# Patient Record
Sex: MALE | Race: WHITE | NOT HISPANIC OR LATINO | URBAN - METROPOLITAN AREA
[De-identification: names, ages, dates, MRNs, and addresses within clinical notes are randomized per-mention and may not be internally consistent; named-entity substitution may affect disease eponyms.]

---

## 2022-07-22 ENCOUNTER — INPATIENT (INPATIENT)
Facility: HOSPITAL | Age: 22
LOS: 0 days | Discharge: HOME | DRG: 379 | End: 2022-07-22
Attending: SPECIALIST | Admitting: SPECIALIST
Payer: COMMERCIAL

## 2022-07-22 VITALS
HEART RATE: 85 BPM | WEIGHT: 205.03 LBS | OXYGEN SATURATION: 98 % | DIASTOLIC BLOOD PRESSURE: 74 MMHG | RESPIRATION RATE: 16 BRPM | TEMPERATURE: 98 F | HEIGHT: 68 IN | SYSTOLIC BLOOD PRESSURE: 114 MMHG

## 2022-07-22 VITALS
OXYGEN SATURATION: 99 % | HEART RATE: 66 BPM | SYSTOLIC BLOOD PRESSURE: 124 MMHG | RESPIRATION RATE: 18 BRPM | DIASTOLIC BLOOD PRESSURE: 76 MMHG | TEMPERATURE: 98 F

## 2022-07-22 DIAGNOSIS — K92.1 MELENA: ICD-10-CM

## 2022-07-22 DIAGNOSIS — Z29.9 ENCOUNTER FOR PROPHYLACTIC MEASURES, UNSPECIFIED: ICD-10-CM

## 2022-07-22 LAB
ANION GAP SERPL CALC-SCNC: 8 MMOL/L — SIGNIFICANT CHANGE UP (ref 5–17)
APTT BLD: 29.2 SEC — SIGNIFICANT CHANGE UP (ref 27.5–35.5)
BLD GP AB SCN SERPL QL: NEGATIVE — SIGNIFICANT CHANGE UP
BLD GP AB SCN SERPL QL: NEGATIVE — SIGNIFICANT CHANGE UP
BUN SERPL-MCNC: 16 MG/DL — SIGNIFICANT CHANGE UP (ref 7–23)
CALCIUM SERPL-MCNC: 8.7 MG/DL — SIGNIFICANT CHANGE UP (ref 8.4–10.5)
CHLORIDE SERPL-SCNC: 106 MMOL/L — SIGNIFICANT CHANGE UP (ref 96–108)
CO2 SERPL-SCNC: 27 MMOL/L — SIGNIFICANT CHANGE UP (ref 22–31)
CREAT SERPL-MCNC: 0.85 MG/DL — SIGNIFICANT CHANGE UP (ref 0.5–1.3)
EGFR: 125 ML/MIN/1.73M2 — SIGNIFICANT CHANGE UP
GLUCOSE SERPL-MCNC: 96 MG/DL — SIGNIFICANT CHANGE UP (ref 70–99)
HCT VFR BLD CALC: 37.9 % — LOW (ref 39–50)
HGB BLD-MCNC: 12.8 G/DL — LOW (ref 13–17)
INR BLD: 1.08 — SIGNIFICANT CHANGE UP (ref 0.88–1.16)
MCHC RBC-ENTMCNC: 29.8 PG — SIGNIFICANT CHANGE UP (ref 27–34)
MCHC RBC-ENTMCNC: 33.8 GM/DL — SIGNIFICANT CHANGE UP (ref 32–36)
MCV RBC AUTO: 88.1 FL — SIGNIFICANT CHANGE UP (ref 80–100)
NRBC # BLD: 0 /100 WBCS — SIGNIFICANT CHANGE UP (ref 0–0)
PLATELET # BLD AUTO: 174 K/UL — SIGNIFICANT CHANGE UP (ref 150–400)
POTASSIUM SERPL-MCNC: 4.4 MMOL/L — SIGNIFICANT CHANGE UP (ref 3.5–5.3)
POTASSIUM SERPL-SCNC: 4.4 MMOL/L — SIGNIFICANT CHANGE UP (ref 3.5–5.3)
PROTHROM AB SERPL-ACNC: 12.9 SEC — SIGNIFICANT CHANGE UP (ref 10.5–13.4)
RBC # BLD: 4.3 M/UL — SIGNIFICANT CHANGE UP (ref 4.2–5.8)
RBC # FLD: 12.8 % — SIGNIFICANT CHANGE UP (ref 10.3–14.5)
RH IG SCN BLD-IMP: POSITIVE — SIGNIFICANT CHANGE UP
RH IG SCN BLD-IMP: POSITIVE — SIGNIFICANT CHANGE UP
SARS-COV-2 RNA SPEC QL NAA+PROBE: NEGATIVE — SIGNIFICANT CHANGE UP
SODIUM SERPL-SCNC: 141 MMOL/L — SIGNIFICANT CHANGE UP (ref 135–145)
WBC # BLD: 8.47 K/UL — SIGNIFICANT CHANGE UP (ref 3.8–10.5)
WBC # FLD AUTO: 8.47 K/UL — SIGNIFICANT CHANGE UP (ref 3.8–10.5)

## 2022-07-22 PROCEDURE — 85027 COMPLETE CBC AUTOMATED: CPT

## 2022-07-22 PROCEDURE — 80048 BASIC METABOLIC PNL TOTAL CA: CPT

## 2022-07-22 PROCEDURE — 86900 BLOOD TYPING SEROLOGIC ABO: CPT

## 2022-07-22 PROCEDURE — 85610 PROTHROMBIN TIME: CPT

## 2022-07-22 PROCEDURE — 99285 EMERGENCY DEPT VISIT HI MDM: CPT | Mod: 25

## 2022-07-22 PROCEDURE — 87635 SARS-COV-2 COVID-19 AMP PRB: CPT

## 2022-07-22 PROCEDURE — 99053 MED SERV 10PM-8AM 24 HR FAC: CPT

## 2022-07-22 PROCEDURE — 36415 COLL VENOUS BLD VENIPUNCTURE: CPT

## 2022-07-22 PROCEDURE — 85730 THROMBOPLASTIN TIME PARTIAL: CPT

## 2022-07-22 PROCEDURE — 99285 EMERGENCY DEPT VISIT HI MDM: CPT

## 2022-07-22 PROCEDURE — A9512: CPT

## 2022-07-22 PROCEDURE — 86901 BLOOD TYPING SEROLOGIC RH(D): CPT

## 2022-07-22 PROCEDURE — 78290 INTESTINE IMAGING: CPT

## 2022-07-22 PROCEDURE — 86850 RBC ANTIBODY SCREEN: CPT

## 2022-07-22 PROCEDURE — 78290 INTESTINE IMAGING: CPT | Mod: 26

## 2022-07-22 RX ORDER — PANTOPRAZOLE SODIUM 20 MG/1
40 TABLET, DELAYED RELEASE ORAL EVERY 12 HOURS
Refills: 0 | Status: DISCONTINUED | OUTPATIENT
Start: 2022-07-22 | End: 2022-07-22

## 2022-07-22 NOTE — PATIENT PROFILE ADULT - FALL HARM RISK - UNIVERSAL INTERVENTIONS
Bed in lowest position, wheels locked, appropriate side rails in place/Call bell, personal items and telephone in reach/Instruct patient to call for assistance before getting out of bed or chair/Non-slip footwear when patient is out of bed/[place] to call system/Physically safe environment - no spills, clutter or unnecessary equipment/Purposeful Proactive Rounding/Room/bathroom lighting operational, light cord in reach

## 2022-07-22 NOTE — PROGRESS NOTE ADULT - SUBJECTIVE AND OBJECTIVE BOX
Pt seen and examined     scope today      MEDICATIONS:  MEDICATIONS  (STANDING):    MEDICATIONS  (PRN):      Allergies    Bactrim (Rash; Urticaria; Hives)    Intolerances        Vital Signs Last 24 Hrs  T(C): 36.9 (22 Jul 2022 07:11), Max: 36.9 (22 Jul 2022 07:11)  T(F): 98.4 (22 Jul 2022 07:11), Max: 98.4 (22 Jul 2022 07:11)  HR: 85 (22 Jul 2022 07:11) (85 - 85)  BP: 114/74 (22 Jul 2022 07:11) (114/74 - 114/74)  BP(mean): --  RR: 16 (22 Jul 2022 07:11) (16 - 16)  SpO2: 98% (22 Jul 2022 07:11) (98% - 98%)    Parameters below as of 22 Jul 2022 07:11  Patient On (Oxygen Delivery Method): room air          PHYSICAL EXAM:    General:  in no acute distress  HEENT: MMM, conjunctiva and sclera clear  Gastrointestinal: Soft non-tender non-distended; Normal bowel sounds;  Skin: Warm and dry. No obvious rash    LABS:      CBC Full  -  ( 22 Jul 2022 08:00 )  WBC Count : 8.47 K/uL  RBC Count : 4.30 M/uL  Hemoglobin : 12.8 g/dL  Hematocrit : 37.9 %  Platelet Count - Automated : 174 K/uL  Mean Cell Volume : 88.1 fl  Mean Cell Hemoglobin : 29.8 pg  Mean Cell Hemoglobin Concentration : 33.8 gm/dL  Auto Neutrophil # : x  Auto Lymphocyte # : x  Auto Monocyte # : x  Auto Eosinophil # : x  Auto Basophil # : x  Auto Neutrophil % : x  Auto Lymphocyte % : x  Auto Monocyte % : x  Auto Eosinophil % : x  Auto Basophil % : x    07-22    141  |  106  |  16  ----------------------------<  96  4.4   |  27  |  0.85    Ca    8.7      22 Jul 2022 10:28      PT/INR - ( 22 Jul 2022 10:28 )   PT: 12.9 sec;   INR: 1.08          PTT - ( 22 Jul 2022 10:28 )  PTT:29.2 sec                  RADIOLOGY & ADDITIONAL STUDIES (The following images were personally reviewed):

## 2022-07-22 NOTE — H&P ADULT - PROBLEM SELECTOR PLAN 1
Patient presenting with BRBPR for 1 day, currently denying abd pain, has intermittent history of constipation and loose stools, has never undergone endoscopic evaluation.   -Patient to undergo endoscopic evaluation with Dr. Andrea    #Anemia-patient with Hg 12, likely secondary to hematochezia versus other etiology.   -follow endoscopic evaluation Patient presenting with BRBPR for 1 day, currently denying abd pain, has intermittent history of constipation and loose stools, has never undergone endoscopic evaluation. Ddx includes meckels vs ibd vs hemorrhoids.   -Patient to undergo endoscopic evaluation with Dr. Andrea  -NM scan for Meckels Diverticulitis     #Anemia-patient with Hg 12, likely secondary to hematochezia versus other etiology.   -follow endoscopic evaluation

## 2022-07-22 NOTE — ED PROVIDER NOTE - GASTROINTESTINAL, MLM
Abdomen soft, non-tender, no guarding. HUI: Small amount of bright red blood in rectum, no clots, fissures, hemorrhoids or masses observed.

## 2022-07-22 NOTE — ED PROVIDER NOTE - CLINICAL SUMMARY MEDICAL DECISION MAKING FREE TEXT BOX
22 y/o M presents with intermittent bright red blood per rectum since yesterday. Vitals are stable. Patient looks well and has no orthostatic symptoms. Belly is soft non tender, +small amount of bright red blood on HUI. Given clinical picture differential includes internal hemorrhoids, diverticulosis, inflammatory bowel disorder, AVM. Do not suspect upper GI bleed, do not suspect brisk lower GI bleed. Plan to obtain CBC, consult GI, and reassess.

## 2022-07-22 NOTE — H&P ADULT - NSHPLABSRESULTS_GEN_ALL_CORE
.  LABS:                         12.8   8.47  )-----------( 174      ( 22 Jul 2022 08:00 )             37.9                         RADIOLOGY, EKG & ADDITIONAL TESTS: Reviewed.

## 2022-07-22 NOTE — H&P ADULT - NSHPPHYSICALEXAM_GEN_ALL_CORE
.  VITAL SIGNS:  T(C): 36.9 (07-22-22 @ 07:11), Max: 36.9 (07-22-22 @ 07:11)  T(F): 98.4 (07-22-22 @ 07:11), Max: 98.4 (07-22-22 @ 07:11)  HR: 85 (07-22-22 @ 07:11) (85 - 85)  BP: 114/74 (07-22-22 @ 07:11) (114/74 - 114/74)  BP(mean): --  RR: 16 (07-22-22 @ 07:11) (16 - 16)  SpO2: 98% (07-22-22 @ 07:11) (98% - 98%)  Wt(kg): --    PHYSICAL EXAM:    Constitutional: WDWN resting comfortably in bed; NAD  Head: NC/AT  Eyes: PERRL, EOMI, clear conjunctiva  ENT: no nasal discharge; uvula midline, no oropharyngeal erythema or exudates; MMM  Neck: supple; no JVD or thyromegaly  Respiratory: CTA B/L; no W/R/R, no retractions  Cardiac: +S1/S2; RRR; no M/R/G; PMI non-displaced  Gastrointestinal: soft, NT/ND; no rebound or guarding; +BSx4  Back: spine midline, no bony tenderness or step-offs; no CVAT B/L  Extremities: WWP, no clubbing or cyanosis; no peripheral edema  Musculoskeletal: NROM x4; no joint swelling, tenderness or erythema  Vascular: 2+ radial, femoral, DP/PT pulses B/L  Dermatologic: skin warm, dry and intact; no rashes, wounds, or scars  Lymphatic: no submandibular or cervical LAD  Neurologic: AAOx3; CNII-XII grossly intact; no focal deficits  Psychiatric: affect and characteristics of appearance, verbalizations, behaviors are appropriate .  VITAL SIGNS:  T(C): 36.9 (07-22-22 @ 07:11), Max: 36.9 (07-22-22 @ 07:11)  T(F): 98.4 (07-22-22 @ 07:11), Max: 98.4 (07-22-22 @ 07:11)  HR: 85 (07-22-22 @ 07:11) (85 - 85)  BP: 114/74 (07-22-22 @ 07:11) (114/74 - 114/74)  BP(mean): --  RR: 16 (07-22-22 @ 07:11) (16 - 16)  SpO2: 98% (07-22-22 @ 07:11) (98% - 98%)  Wt(kg): --    PHYSICAL EXAM:    Constitutional: NAD  Head: NC/AT  Eyes: PERRL, EOMI, clear conjunctiva  ENT: no nasal discharge; uvula midline, no oropharyngeal erythema or exudates; MMM  Neck: supple; no JVD or thyromegaly  Respiratory: CTA B/L; no W/R/R, no retractions  Cardiac: +S1/S2; RRR; no M/R/G; PMI non-displaced  Gastrointestinal: soft, NT/ND; no rebound or guarding; +BSx4  Back: spine midline, no bony tenderness or step-offs; no CVAT B/L  Extremities: WWP, no clubbing or cyanosis; no peripheral edema  Musculoskeletal: NROM x4; no joint swelling, tenderness or erythema  Vascular: 2+ radial, femoral, DP/PT pulses B/L  Dermatologic: skin warm, dry and intact; no rashes, wounds, or scars  Lymphatic: no submandibular or cervical LAD  Neurologic: AAOx3; CNII-XII grossly intact; no focal deficits  Psychiatric: affect and characteristics of appearance, verbalizations, behaviors are appropriate

## 2022-07-22 NOTE — H&P ADULT - HISTORY OF PRESENT ILLNESS
Patient is a 22 y/o M, with chronic intermittent GI issues (constipation and loose stool at times), never seen by GI, has never underwent colonoscopy, without history of prior rectal bleeding, now presents with intermittent bright red blood per rectum since yesterday without abdominal pain, fever, chills, or history of hemorrhoids. Patient's abdomen is soft, non tender, in the ED, patient's HUI positive for small amount of BRB.   In the ED: T 98, HR 85, /74, RR 16 98%  Labs: Hg 12.8, Hct 37.9, Plt 174  Course: GI (Dr. Andrea) Consulted-patient to go for procedure today.         clinical picture differential includes internal hemorrhoids, diverticulosis, inflammatory bowel disorder, AVM. Do not suspect upper GI bleed, do not suspect brisk lower GI bleed. Patient is a 20 y/o M, never seen by GI, has never underwent colonoscopy, without history of prior rectal bleeding, now presents with intermittent bright red blood per rectum since Wednesday. He denies abd pain, chest pain, shortness of breath, change in dietary habits. Denying diarrhea but has intermittent constipation. His first episode of BRBPR occurred wednesday evening, and then since then every BM has been streaked with blood, both in the bowl and in the toilet paper. He has no history of hemorrhoids. In the ED, patient's HUI positive for small amount of BRB.   In the ED: T 98, HR 85, /74, RR 16 98%  Labs: Hg 12.8, Hct 37.9, Plt 174  Course: GI (Dr. Andrea) Consulted-patient to go for procedure today.         clinical picture differential includes internal hemorrhoids, diverticulosis, inflammatory bowel disorder, AVM. Do not suspect upper GI bleed, do not suspect brisk lower GI bleed. Patient is a 20 y/o M, never seen by GI, has never underwent colonoscopy, without history of prior rectal bleeding, now presents with intermittent bright red blood per rectum since Wednesday. He denies abd pain, chest pain, shortness of breath, change in dietary habits. Denying diarrhea but has intermittent constipation. His first episode of BRBPR occurred wednesday evening, and then since then every BM has been streaked with blood, both in the bowl and in the toilet paper. He has no history of hemorrhoids. In the ED, patient's HUI positive for small amount of BRB.   In the ED: T 98, HR 85, /74, RR 16 98%  Labs: Hg 12.8, Hct 37.9, Plt 174  Course: GI (Dr. Andrea) Consulted-patient to go for procedure today.

## 2022-07-22 NOTE — ED PROVIDER NOTE - OBJECTIVE STATEMENT
20 y/o M, healthy, with chronic intermittent GI issues (constipation and loose stool at times ) without history of prior rectal bleeding now presents with intermittent bright red blood per rectum since yesterday without abdominal pain, fever, chills, or history of hemorrhoids. Patient denies blood thinner use and no history of recent straining with bowel movements.

## 2022-07-22 NOTE — H&P ADULT - ASSESSMENT
Patient is a 20 y/o M, with chronic intermittent GI issues (constipation and loose stool at times), never seen by GI, has never underwent colonoscopy, without history of prior rectal bleeding, now presents with intermittent bright red blood per rectum.

## 2022-07-22 NOTE — DISCHARGE NOTE PROVIDER - NSDCCPCAREPLAN_GEN_ALL_CORE_FT
PRINCIPAL DISCHARGE DIAGNOSIS  Diagnosis: Lower GI bleeding  Assessment and Plan of Treatment:        PRINCIPAL DISCHARGE DIAGNOSIS  Diagnosis: Lower GI bleeding  Assessment and Plan of Treatment: You presented with rectal bleeding that was painless and not associated with abd pain, constipation, diarrhea. You underwent sigmoidoscopy that evaluated your large colon, and were found to have dark blood throughought the colon. As such, you underwent a scan to evaluate for a disease called Meckel's Diverticulum which is a cause of painless bleeding. This scan was also negative.  You were discharged with instructions to have a clear liquid diet this evening and with plans to follow up with Dr. Andrea (gastroenterology) within 1 week of discharge.      SECONDARY DISCHARGE DIAGNOSES  Diagnosis: Anemia  Assessment and Plan of Treatment: Anemia is the medical term for when a person has too few red blood cells. Red blood cells are the cells in your blood that carry oxygen. If you have too few red blood cells, your body does not get all the oxygen it needs. Most people with anemia have no symptoms. They find out they have it after their doctor does blood tests for another reason. People who do have symptoms might feel tired or weak, especially if they try to exercise or have headaches. If you experience these symptoms you should see your primary care provider for further evaluation.

## 2022-07-22 NOTE — DISCHARGE NOTE PROVIDER - CARE PROVIDER_API CALL
Ramesh Andrea)  [facility]  [address], [address]  [place] [address]  Phone: [phone]  Fax: [phone]  Follow Up Time: 2 weeks

## 2022-07-22 NOTE — ED ADULT NURSE NOTE - OBJECTIVE STATEMENT
21 y M complaining of rectal bleeding. pt states since Wednesday he has noticed blood in stool. Pt states he noticed this morning and yesterday he had increased stool, along with diarrhea that looked like clots. Pt denies feeling weak, light headed. denies chest pain, sob, nausea, vomiting, fever

## 2022-07-22 NOTE — H&P ADULT - NSHPSOCIALHISTORY_GEN_ALL_CORE
Works as a fashion merchandise consultant. Social drinker-drinks 2-3 times monthly mostly beer. No smoking, no marijuana, no other drugs.

## 2022-07-22 NOTE — ED ADULT NURSE REASSESSMENT NOTE - NS ED NURSE REASSESS COMMENT FT1
holding RN break coverage Pt is physically in Endoscopy at this time, reported to be stable prior to transport will assess upon return

## 2022-07-22 NOTE — DISCHARGE NOTE NURSING/CASE MANAGEMENT/SOCIAL WORK - PATIENT PORTAL LINK FT
You can access the FollowMyHealth Patient Portal offered by [facility] by registering at the following website: [email or web address]. By joining [employer]’s FollowMyHealth portal, you will also be able to view your health information using other applications (apps) compatible with our system.

## 2022-07-22 NOTE — DISCHARGE NOTE PROVIDER - HOSPITAL COURSE
Patient is a 20 y/o M, with chronic intermittent GI issues (constipation and loose stool at times), never seen by GI, has never underwent colonoscopy, without history of prior rectal bleeding, presented to ED with intermittent bright red blood per rectum, seen by Dr. Andrea with flex sigmoidoscopy showing:      Inpatient treatment course:   #Hematochezia: Patient underwent flex sigmoidoscopy demonstrating_______. Hg 12.   -    New medications:    Patient is a 22 y/o M, with chronic intermittent GI issues (constipation and loose stool at times), never seen by GI, has never underwent colonoscopy, without history of prior rectal bleeding, presented to ED with intermittent bright red blood per rectum, seen by Dr. Andrea with flex sigmoidoscopy showing:    #Hematochezia: Patient underwent flex sigmoidoscopy demonstrating black blood throughout the colon. DDx including IBD versus hemorrhoids vs Meckels.   -Patient underwent sigmoidoscopy demonstrating black stool throughout colon.   -Patient underwent NM scan to evaluate for Meckel's.      New medications:      Patient is a 20 y/o M, with chronic intermittent GI issues (constipation and loose stool at times), never seen by GI, has never underwent colonoscopy, without history of prior rectal bleeding, presented to ED with intermittent bright red blood per rectum, seen by Dr. Andrea with flex sigmoidoscopy showing blood throughout colon. As such, patient underwent Nuclear Medicine scan to evaluate for Meckel's Diverticulum, which was negative as well. On discharge, patient is stable, no longer bleeding.     #Hematochezia: Patient underwent flex sigmoidoscopy demonstrating black blood throughout the colon. DDx including IBD versus hemorrhoids vs Meckels.   -Patient underwent sigmoidoscopy demonstrating black stool throughout colon.   -Patient underwent NM scan to evaluate for Meckel's that was negative.   -Patient to follow up with Dr. Andrea outpatient.       New medications:   None    Exam at Discharge:  Constitutional: NAD  Head: NC/AT  Eyes: PERRL, EOMI, clear conjunctiva  ENT: no nasal discharge; uvula midline, no oropharyngeal erythema or exudates; MMM  Neck: supple; no JVD or thyromegaly  Respiratory: CTA B/L; no W/R/R, no retractions  Cardiac: +S1/S2; RRR; no M/R/G; PMI non-displaced  Gastrointestinal: soft, NT/ND; no rebound or guarding; +BSx4  Back: spine midline, no bony tenderness or step-offs; no CVAT B/L  Extremities: WWP, no clubbing or cyanosis; no peripheral edema  Musculoskeletal: NROM x4; no joint swelling, tenderness or erythema  Vascular: 2+ radial, femoral, DP/PT pulses B/L  Dermatologic: skin warm, dry and intact; no rashes, wounds, or scars  Lymphatic: no submandibular or cervical LAD  Neurologic: AAOx3; CNII-XII grossly intact; no focal deficits  Psychiatric: affect and characteristics of appearance, verbalizations, behaviors are appropriate

## 2022-07-27 DIAGNOSIS — K62.5 HEMORRHAGE OF ANUS AND RECTUM: ICD-10-CM

## 2022-07-27 DIAGNOSIS — Z88.1 ALLERGY STATUS TO OTHER ANTIBIOTIC AGENTS STATUS: ICD-10-CM

## 2022-07-27 DIAGNOSIS — K92.2 GASTROINTESTINAL HEMORRHAGE, UNSPECIFIED: ICD-10-CM

## 2022-07-27 DIAGNOSIS — D50.0 IRON DEFICIENCY ANEMIA SECONDARY TO BLOOD LOSS (CHRONIC): ICD-10-CM

## 2022-08-03 PROBLEM — K59.00 CONSTIPATION, UNSPECIFIED: Chronic | Status: ACTIVE | Noted: 2022-07-22

## 2022-08-17 ENCOUNTER — OUTPATIENT (OUTPATIENT)
Dept: OUTPATIENT SERVICES | Facility: HOSPITAL | Age: 22
LOS: 1 days | End: 2022-08-17

## 2022-08-17 DIAGNOSIS — Z87.19 PERSONAL HISTORY OF OTHER DISEASES OF THE DIGESTIVE SYSTEM: ICD-10-CM

## 2023-01-30 NOTE — PATIENT PROFILE ADULT - FUNCTIONAL ASSESSMENT - BASIC MOBILITY ASSESSMENT TYPE
Chief Complaint / Reason:      Chief Complaint   Patient presents with   • Cough     Covid positive 1.5 weeks ago. Ringing in ears       Subjective     HPI  Patient presents today for follow-up regarding cough and states that he had COVID over a week ago and still continues to have a cough but denies chest pain, shortness of breath or heart palpitations.  Patient does have a history of seasonal allergies and reflux.  Denies fever or chills.  He does report ringing in his ears.  Vital signs are stable.  History taken from: patient    PMH/FH/Social History were reviewed and updated appropriately in the electronic medical record.   Past Medical History:   Diagnosis Date   • Acid reflux     uncontrolled on dexilant 60mg QD, wakes up nightly with symptoms   • Allergic     allergy injections   • Anxiety    • Depression    • Depression    • Elevated cholesterol    • Fatigue    • Fibroid    • Hiatal hernia     with past repair   • Hypertension    • Iron deficiency anemia     not on supplments   • Migraines     topamax nightly and APAP prn   • Obesity (BMI 30-39.9)    • Ovarian cyst    • Sleep apnea     non compliant with CPAP   • Type 2 diabetes mellitus (HCC)     not on insulin, last A1C 5.1     Past Surgical History:   Procedure Laterality Date   • CARPAL TUNNEL RELEASE     • ENDOSCOPY  2020    Dr. Paul Beauchamp   • KNEE SURGERY      bilateral    • LAPAROSCOPY REPAIR HIATAL HERNIA      Dr. Matilda Beauchamp? with mesh   • ROTATOR CUFF REPAIR     • SINUS SURGERY       Social History     Socioeconomic History   • Marital status:    Tobacco Use   • Smoking status: Former     Packs/day: 2.00     Years: 10.00     Pack years: 20.00     Types: Cigarettes     Quit date: 2001     Years since quittin.7   • Smokeless tobacco: Never   Vaping Use   • Vaping Use: Never used   Substance and Sexual Activity   • Alcohol use: No   • Drug use: Yes     Types: Marijuana     Comment: occasionally   •  Sexual activity: Yes     Partners: Male     Family History   Problem Relation Age of Onset   • Prostate cancer Father    • Colon cancer Father    • Obesity Father    • Breast cancer Mother    • Obesity Mother    • Diabetes Mother    • Hypertension Mother    • Sleep apnea Mother    • Obesity Brother    • Hypertension Brother    • Stroke Brother    • Obesity Brother    • Hypertension Brother    • Heart attack Brother    • Sleep apnea Brother    • Obesity Sister    • Hypertension Sister    • Sleep apnea Sister    • Obesity Sister    • Hypertension Sister    • Obesity Paternal Grandfather    • Diabetes Paternal Grandfather    • Obesity Paternal Grandmother    • Heart attack Paternal Grandmother    • Obesity Maternal Grandmother    • Obesity Maternal Grandfather    • Hyperlipidemia Other    • Arthritis Other    • Mental illness Other    • Migraines Other        Review of Systems:   Review of Systems      All other systems were reviewed and are negative.  Exceptions are noted in the subjective or above.      Objective     Vital Signs  Vitals:    01/12/23 1025   BP: 120/83   Pulse: 94   Resp: 16   Temp: 97.1 °F (36.2 °C)   SpO2: 100%       Body mass index is 36.56 kg/m².    Physical Exam  Vitals and nursing note reviewed.   Constitutional:       General: She is not in acute distress.     Appearance: She is well-developed.   HENT:      Head: Normocephalic and atraumatic.      Right Ear: Ear canal and external ear normal. Tympanic membrane is erythematous and bulging.      Left Ear: Ear canal and external ear normal. Tympanic membrane is erythematous and bulging.      Nose: Mucosal edema present. No rhinorrhea.      Right Sinus: No maxillary sinus tenderness or frontal sinus tenderness.      Left Sinus: No maxillary sinus tenderness or frontal sinus tenderness.      Mouth/Throat:      Mouth: Mucous membranes are dry.      Pharynx: Posterior oropharyngeal erythema present.      Comments: PND    Eyes:      Conjunctiva/sclera:  Conjunctivae normal.   Cardiovascular:      Rate and Rhythm: Normal rate and regular rhythm.      Heart sounds: Normal heart sounds.   Pulmonary:      Effort: Pulmonary effort is normal. No respiratory distress.      Breath sounds: Normal breath sounds.   Lymphadenopathy:      Head:      Right side of head: No submental, submandibular or tonsillar adenopathy.      Left side of head: No submental, submandibular or tonsillar adenopathy.      Cervical: No cervical adenopathy.   Skin:     General: Skin is warm and dry.      Capillary Refill: Capillary refill takes less than 2 seconds.      Findings: No rash.   Neurological:      Mental Status: She is alert and oriented to person, place, and time.   Psychiatric:         Behavior: Behavior normal.         Thought Content: Thought content normal.         Judgment: Judgment normal.              Results Review:    I reviewed the patient's previous clinical results.       Medication Review:     Current Outpatient Medications:   •  Canagliflozin-metFORMIN HCl  MG tablet, Take  by mouth 2 (two) times a day., Disp: , Rfl:   •  clotrimazole (LOTRIMIN) 1 % cream, Prn, Disp: , Rfl:   •  dexlansoprazole (Dexilant) 60 MG capsule, Take 60 mg by mouth Daily., Disp: , Rfl:   •  fexofenadine (ALLEGRA) 180 MG tablet, Take 180 mg by mouth Daily., Disp: , Rfl:   •  glimepiride (AMARYL) 1 MG tablet, Take 1 mg by mouth 2 (two) times a day., Disp: , Rfl:   •  glucose monitor monitoring kit, 1 each Daily. USE MACHINE ONCE A DAY AS DIRECTED, Disp: 1 each, Rfl: 0  •  hydroCHLOROthiazide (HYDRODIURIL) 12.5 MG tablet, Take 1 tablet by mouth Daily., Disp: 90 tablet, Rfl: 1  •  hydrOXYzine pamoate (VISTARIL) 50 MG capsule, Take 1 capsule by mouth Every Night., Disp: 90 capsule, Rfl: 0  •  meloxicam (Mobic) 7.5 MG tablet, Take 1 tablet by mouth Daily., Disp: 30 tablet, Rfl: 0  •  topiramate (TOPAMAX) 100 MG tablet, Take 1 tablet by mouth Daily Before Lunch., Disp: 90 tablet, Rfl: 0  •  True  Metrix Blood Glucose Test test strip, 1 each by Other route Daily., Disp: 100 each, Rfl: 4  •  albuterol sulfate  (90 Base) MCG/ACT inhaler, Inhale 2 puffs Every 4 (Four) Hours As Needed for Wheezing or Shortness of Air., Disp: 18 g, Rfl: 2  •  atorvastatin (LIPITOR) 40 MG tablet, Take 40 mg by mouth Daily., Disp: , Rfl:   •  DULoxetine (CYMBALTA) 30 MG capsule, Take 1 capsule by mouth Daily., Disp: 90 capsule, Rfl: 0  •  DULoxetine (CYMBALTA) 60 MG capsule, Take 1 capsule by mouth Daily., Disp: 90 capsule, Rfl: 1  •  methocarbamol (ROBAXIN) 500 MG tablet, Take 500 mg by mouth 4 (Four) Times a Day., Disp: , Rfl:   •  montelukast (SINGULAIR) 10 MG tablet, Take 1 tablet by mouth Every Night., Disp: 90 tablet, Rfl: 0  •  promethazine-dextromethorphan (PROMETHAZINE-DM) 6.25-15 MG/5ML syrup, Take 5 mL by mouth At Night As Needed for Cough., Disp: 118 mL, Rfl: 0  •  tiZANidine (ZANAFLEX) 4 MG tablet, Take 1 tablet by mouth Every 8 (Eight) Hours As Needed for Muscle Spasms., Disp: 90 tablet, Rfl: 0    Diagnoses and all orders for this visit:    Post-viral cough syndrome  -     promethazine-dextromethorphan (PROMETHAZINE-DM) 6.25-15 MG/5ML syrup; Take 5 mL by mouth At Night As Needed for Cough.    Other orders  -     albuterol sulfate  (90 Base) MCG/ACT inhaler; Inhale 2 puffs Every 4 (Four) Hours As Needed for Wheezing or Shortness of Air.          No follow-ups on file.    Moon Cormier, APRN  01/12/2023             Admission

## 2024-11-12 ENCOUNTER — APPOINTMENT (OUTPATIENT)
Dept: DERMATOLOGY | Facility: CLINIC | Age: 24
End: 2024-11-12
Payer: COMMERCIAL

## 2024-11-12 VITALS — BODY MASS INDEX: 30.36 KG/M2 | WEIGHT: 205 LBS | HEIGHT: 69 IN

## 2024-11-12 DIAGNOSIS — D22.9 MELANOCYTIC NEVI, UNSPECIFIED: ICD-10-CM

## 2024-11-12 DIAGNOSIS — L21.9 SEBORRHEIC DERMATITIS, UNSPECIFIED: ICD-10-CM

## 2024-11-12 DIAGNOSIS — L70.0 ACNE VULGARIS: ICD-10-CM

## 2024-11-12 PROBLEM — Z00.00 ENCOUNTER FOR PREVENTIVE HEALTH EXAMINATION: Status: ACTIVE | Noted: 2024-11-12

## 2024-11-12 PROCEDURE — 99204 OFFICE O/P NEW MOD 45 MIN: CPT

## 2024-11-12 RX ORDER — CLOBETASOL PROPIONATE 0.5 MG/ML
0.05 SOLUTION TOPICAL
Qty: 1 | Refills: 2 | Status: ACTIVE | COMMUNITY
Start: 2024-11-12 | End: 1900-01-01

## 2024-11-12 RX ORDER — CLINDAMYCIN PHOSPHATE 10 MG/ML
1 LOTION TOPICAL
Qty: 1 | Refills: 2 | Status: ACTIVE | COMMUNITY
Start: 2024-11-12 | End: 1900-01-01

## 2024-11-12 RX ORDER — KETOCONAZOLE 20 MG/ML
2 SUSPENSION TOPICAL
Qty: 1 | Refills: 5 | Status: ACTIVE | COMMUNITY
Start: 2024-11-12 | End: 1900-01-01

## 2024-11-12 RX ORDER — HYDROCORTISONE 25 MG/G
2.5 CREAM TOPICAL
Qty: 1 | Refills: 0 | Status: ACTIVE | COMMUNITY
Start: 2024-11-12 | End: 1900-01-01

## 2025-03-27 NOTE — ED PROVIDER NOTE - NEUROLOGICAL, MLM
Include Location In Plan?: No Detail Level: Zone Alert and oriented, no focal deficits, no motor or sensory deficits.